# Patient Record
Sex: MALE | Race: WHITE | NOT HISPANIC OR LATINO | Employment: FULL TIME | URBAN - METROPOLITAN AREA
[De-identification: names, ages, dates, MRNs, and addresses within clinical notes are randomized per-mention and may not be internally consistent; named-entity substitution may affect disease eponyms.]

---

## 2024-04-16 ENCOUNTER — OFFICE VISIT (OUTPATIENT)
Dept: PAIN MEDICINE | Facility: CLINIC | Age: 49
End: 2024-04-16
Payer: COMMERCIAL

## 2024-04-16 VITALS
DIASTOLIC BLOOD PRESSURE: 68 MMHG | BODY MASS INDEX: 30.35 KG/M2 | HEART RATE: 72 BPM | SYSTOLIC BLOOD PRESSURE: 137 MMHG | WEIGHT: 242.81 LBS

## 2024-04-16 DIAGNOSIS — M25.851 HIP IMPINGEMENT SYNDROME, RIGHT: ICD-10-CM

## 2024-04-16 DIAGNOSIS — Z98.1 HISTORY OF LUMBAR FUSION: ICD-10-CM

## 2024-04-16 DIAGNOSIS — M54.16 LUMBAR RADICULOPATHY: Primary | ICD-10-CM

## 2024-04-16 PROCEDURE — 3075F SYST BP GE 130 - 139MM HG: CPT | Mod: CPTII,S$GLB,, | Performed by: PHYSICIAN ASSISTANT

## 2024-04-16 PROCEDURE — 1160F RVW MEDS BY RX/DR IN RCRD: CPT | Mod: CPTII,S$GLB,, | Performed by: PHYSICIAN ASSISTANT

## 2024-04-16 PROCEDURE — 1159F MED LIST DOCD IN RCRD: CPT | Mod: CPTII,S$GLB,, | Performed by: PHYSICIAN ASSISTANT

## 2024-04-16 PROCEDURE — 3008F BODY MASS INDEX DOCD: CPT | Mod: CPTII,S$GLB,, | Performed by: PHYSICIAN ASSISTANT

## 2024-04-16 PROCEDURE — 99999 PR PBB SHADOW E&M-EST. PATIENT-LVL III: CPT | Mod: PBBFAC,,, | Performed by: PHYSICIAN ASSISTANT

## 2024-04-16 PROCEDURE — 3078F DIAST BP <80 MM HG: CPT | Mod: CPTII,S$GLB,, | Performed by: PHYSICIAN ASSISTANT

## 2024-04-16 PROCEDURE — 99203 OFFICE O/P NEW LOW 30 MIN: CPT | Mod: S$GLB,,, | Performed by: PHYSICIAN ASSISTANT

## 2024-04-16 RX ORDER — TIZANIDINE 4 MG/1
4 TABLET ORAL EVERY 12 HOURS PRN
Qty: 40 TABLET | Refills: 0 | Status: SHIPPED | OUTPATIENT
Start: 2024-04-16

## 2024-04-16 RX ORDER — TIZANIDINE 4 MG/1
4 TABLET ORAL EVERY 12 HOURS PRN
Qty: 40 TABLET | Refills: 0 | Status: SHIPPED | OUTPATIENT
Start: 2024-04-16 | End: 2024-04-16

## 2024-04-16 NOTE — PROGRESS NOTES
[facility] New Patient Evaluation      Referred by: Dr. Alonso Mckeon    PCP: none    CC: No chief complaint on file.         HPI:   Dat Palm is a 49 y.o. year old male patient who has no past medical history on file. He presents in referral from Dr. Alonso Mckeon for lower back and right leg pain.  He had 5 lumbar spine surgeries from [id number].  His last surgery was a lumbar fusion and complicated by meningitis with hardware removal/ replacment about 1 month after initial fusion.  Surgery was performed by Dr. Belcher at [facility].  Since his last surgery, he has been overall well with intermittent manageable back pain.  Current pain started without any focal triggering event 4-9-20.  He awoke that  morning with right lower back pain pain and was seen in an [place], [place] ER.  Pain progressed and changed locations.  Seen in [facility] ER 4-13-24.  Currently he feels pain in the right hip, groin, anterior thigh, at times posterior thigh and calf.  It is sharp pain.  He has tried norco, naproxen, prednisone taper started, and zanaflex for pain.      Denies bowel/ bladder incontinence.    Past and current medications:  Antineuropathics:  NSAIDs:  naproxen  Antidepressants:  Muscle relaxers: zanaflex  Opioids: norco  Antiplatelets/Anticoagulants:  Others:  prednisone    Physical Therapy/ Chiropractic care:  none    Pain Intervention History:  none    Past Spine Surgical History:  Lumbar surgeries 5 from 2003 to 2008; L4-S1 fusion was the 4th surgery and complicated by meningitis with subsequent 5th surgery, hardware replacement.        History:    Current Outpatient Medications:     HYDROcodone-acetaminophen (NORCO) 5-325 mg per tablet, Take 1 tablet by mouth every 4 (four) hours as needed for Pain., Disp: 12 tablet, Rfl: 0    naproxen (NAPROSYN) 500 MG tablet, Take 1 tablet (500 mg total) by mouth 2 (two) times daily with meals., Disp: 20 tablet, Rfl: 0    predniSONE (DELTASONE) 20 MG tablet, Take 2  "tablets (40 mg total) by mouth once daily. for 5 days, Disp: 10 tablet, Rfl: 0    tiZANidine (ZANAFLEX) 4 MG tablet, Take 1 tablet (4 mg total) by mouth every 12 (twelve) hours as needed (muscle spasms/ pain)., Disp: 40 tablet, Rfl: 0    No past medical history on file.    Past Surgical History:   Procedure Laterality Date    LUMBAR SPINE SURGERY         No family history on file.    Social History     Socioeconomic History    Marital status:        Review of patient's allergies indicates:  No Known Allergies    Labs:  No results found for: "LABA1C", "HGBA1C"    No results found for: "WBC", "HGB", "HCT", "MCV", "PLT"        Review of Systems:  Low back pain.  Right leg pain..  Balance of review of systems is negative.    Physical Exam:  Vitals:    04/16/24 1322   BP: 137/68   Pulse: 72   Weight: 110.2 kg (242 lb 13.4 oz)   PainSc:   6   PainLoc: Hip     Body mass index is 30.35 kg/m².    Pain disability index:      4/16/2024     1:23 PM   Last 3 PDI Scores   Pain Disability Index (PDI) 47       Gen: NAD  Psych: mood appropriate for given condition  HEENT: eyes anicteric   CV: RRR, 2+ radial pulse  Respiratory: non-labored, no signs of respiratory distress  Abd: non-distended  Skin: warm, dry and intact.  Gait: antalgic gait; unable  to heel walk, toe walk due to pain.    Coordination: Tandem walking coordination: not tested; antalgic.    Cervical spine: ROM is full in flexion, extension and lateral rotation without increased pain.  Spurling's maneuver causes no neck pain to either side.  Myofascial exam: No Tenderness to palpation across cervical paraspinous region bilaterally.    Lumbar spine:  Lumbar spine: ROM is full with flexion extension and oblique extension with increased pain.    Yayo's test causes no increased pain on either side.    Supine straight leg raise is negative bilaterally.    Internal and external rotation of the hip causes no increased pain on either side.  Myofascial exam: Tenderness " to palpation across lumbar paraspinous muscles. No tenderness to palpation over the bilateral greater trochanters and bilateral SI joint    Sensory:  Intact and symmetrical to light touch in C4-T1 dermatomes bilaterally. Intact and symmetrical to light touch in L1-S1 dermatomes bilaterally.    Motor:    Right Left   C4 Shoulder Abduction  5  5   C5 Elbow Flexion    5  5   C6 Wrist Extension  5  5   C7 Elbow Extension   5  5   C8/T1 Hand Intrinsics   5  5        Right Left   L2/3 Iliacus Hip flexion  5  5   L3/4 Qudratus Femoris Knee Extension  5  5   L4/5 Tib Anterior Ankle Dorsiflexion   5  5   L5/S1 Extensor Hallicus Longus Great toe extension  5  5   S1/S2 Gastroc/Soleus Plantar Flexion  5  5      Right Left   Triceps DTR 2+ 2+   Biceps DTR 2+ 2+   Brachioradialis DTR 2+ 2+   Patellar DTR 2+ 2+   Achilles DTR 2+ 2+   Dickinson Absent  Absent   Clonus Absent Absent   Babinski Absent Absent       Imaging:  Xray lumbar spine 4-13-24:    Five lumbar type vertebral bodies. L4-S1 posterior fusion. Hardware appears intact without evidence of loosening or fracture. L4 and 5 laminectomies are present. Trace retrolisthesis L1 on L2 and L2 on L3. vertebral body heights are preserved. No acute osseous fracture. Disc spaces are mostly preserved with multilevel endplate changes. Hypertrophic facet changes are present, greatest in the lower lumbar spine. Sacroiliac joints are not widened. No radiopaque soft tissue foreign body.     Xray right hip 4-13-24:There are no fractures seen. There is no dislocation. There is a prominent lateral margin of the acetabulum on the right with changes suspicious for impingement. There is hardware in the lumbar spine.      Assessment:   Dat Palm is a 49 y.o. year old male patient who has no past medical history on file. He presents in referral from Dr. Alonso Mckeon for right lower back, groin, thigh, leg pain.  Symptoms are more consistent with lumabr radiculapthy than hip pathology at this  time.  Possible radiculoapthy from adjacent level disease at L3/4 after lumbar fusion.    Plan:  - discussed medications, PT, obtaining MRI to consider ALEXEY.  He is not anxious for any further surgery.  - will refill tizanidine for now.  - he is interetsted in PT and will call back with location after discussing with insurance.  In the mean time, start home exercise; instructions given.    - if pain increases I the groin/ hip region specifically, can refer to orthopedics for further assessment as well.     Problem List Items Addressed This Visit    None  Visit Diagnoses       Lumbar radiculopathy    -  Primary    Relevant Medications    tiZANidine (ZANAFLEX) 4 MG tablet    Hip impingement syndrome, right        History of lumbar fusion        Relevant Medications    tiZANidine (ZANAFLEX) 4 MG tablet            Follow Up: RTC as needed if no improvement with PT.    : Reviewed and consistent with medication use as prescribed.    Thank you for referring this interesting patient, and I look forward to continuing to collaborate in his care.        Isaebl Leslie PA-C  [facility]